# Patient Record
Sex: MALE | Race: WHITE | Employment: FULL TIME | URBAN - METROPOLITAN AREA
[De-identification: names, ages, dates, MRNs, and addresses within clinical notes are randomized per-mention and may not be internally consistent; named-entity substitution may affect disease eponyms.]

---

## 2017-11-20 ENCOUNTER — OFFICE VISIT (OUTPATIENT)
Dept: MEDICAL GROUP | Facility: PHYSICIAN GROUP | Age: 42
End: 2017-11-20
Payer: COMMERCIAL

## 2017-11-20 VITALS
RESPIRATION RATE: 14 BRPM | TEMPERATURE: 98.2 F | BODY MASS INDEX: 36.68 KG/M2 | SYSTOLIC BLOOD PRESSURE: 126 MMHG | HEIGHT: 71 IN | OXYGEN SATURATION: 96 % | DIASTOLIC BLOOD PRESSURE: 88 MMHG | WEIGHT: 262 LBS | HEART RATE: 90 BPM

## 2017-11-20 DIAGNOSIS — E66.9 OBESITY (BMI 35.0-39.9 WITHOUT COMORBIDITY): ICD-10-CM

## 2017-11-20 DIAGNOSIS — M54.50 ACUTE RIGHT-SIDED LOW BACK PAIN WITHOUT SCIATICA: ICD-10-CM

## 2017-11-20 PROCEDURE — 99214 OFFICE O/P EST MOD 30 MIN: CPT | Mod: 25 | Performed by: NURSE PRACTITIONER

## 2017-11-20 RX ORDER — KETOROLAC TROMETHAMINE 30 MG/ML
30 INJECTION, SOLUTION INTRAMUSCULAR; INTRAVENOUS ONCE
Status: DISCONTINUED | OUTPATIENT
Start: 2017-11-20 | End: 2017-11-20

## 2017-11-20 RX ORDER — CYCLOBENZAPRINE HCL 10 MG
10 TABLET ORAL 3 TIMES DAILY PRN
Qty: 30 TAB | Refills: 0 | Status: SHIPPED | OUTPATIENT
Start: 2017-11-20 | End: 2017-12-19

## 2017-11-20 RX ORDER — HYDROCODONE BITARTRATE AND ACETAMINOPHEN 5; 325 MG/1; MG/1
1-2 TABLET ORAL EVERY 4 HOURS PRN
Qty: 20 TAB | Refills: 0 | Status: SHIPPED | OUTPATIENT
Start: 2017-11-20 | End: 2017-12-19

## 2017-11-20 RX ORDER — KETOROLAC TROMETHAMINE 30 MG/ML
60 INJECTION, SOLUTION INTRAMUSCULAR; INTRAVENOUS ONCE
Status: COMPLETED | OUTPATIENT
Start: 2017-11-20 | End: 2017-11-20

## 2017-11-20 RX ADMIN — KETOROLAC TROMETHAMINE 60 MG: 30 INJECTION, SOLUTION INTRAMUSCULAR; INTRAVENOUS at 15:55

## 2017-11-20 ASSESSMENT — PATIENT HEALTH QUESTIONNAIRE - PHQ9: CLINICAL INTERPRETATION OF PHQ2 SCORE: 0

## 2017-11-20 NOTE — ASSESSMENT & PLAN NOTE
Patients bmi 36.54 today.  Has recently joined a gym.  Has a goal to lose 20 pounds in the next year.

## 2017-11-20 NOTE — PROGRESS NOTES
Chief Complaint   Patient presents with   • Low Back Pain       Subjective:   Evgeny Marcum is a 41 y.o. Male patient of Lorelei Adams here today for evaluation and management of:    Acute right-sided low back pain without sciatica  Started Saturday after sitting at bb game in chair.  Felt a tweak.  No bladder or bowel control loss.    No sciatica reported.  Unable to bear complete weight due to pain.  Right club foot.     Obesity (BMI 35.0-39.9 without comorbidity) ([facility])  Patients bmi 36.54 today.  Has recently joined a gym.  Has a goal to lose 20 pounds in the next year.      Patient had identical episode 6-8 months ago that improved with tordal, flexeril, norco prn night time only, ice    Current medicines (including changes today)  Current Outpatient Prescriptions   Medication Sig Dispense Refill   • cyclobenzaprine (FLEXERIL) 10 MG Tab Take 1 Tab by mouth 3 times a day as needed. 30 Tab 0   • hydrocodone-acetaminophen (NORCO) 5-325 MG Tab per tablet Take 1-2 Tabs by mouth every four hours as needed. 20 Tab 0   • cyclobenzaprine (FLEXERIL) 10 MG Tab Take 0.5-1 Tabs by mouth 3 times a day as needed for Mild Pain or Muscle Spasms. 30 Tab 0   • hydrocodone-acetaminophen (NORCO) 5-325 MG Tab per tablet Take 1-2 Tabs by mouth every 8 hours as needed. 20 Tab 0   • ibuprofen (MOTRIN) 800 MG Tab Take 1 Tab by mouth every 8 hours as needed for Mild Pain or Inflammation. 60 Tab 0     Current Facility-Administered Medications   Medication Dose Route Frequency Provider Last Rate Last Dose   • ketorolac (TORADOL) injection 60 mg  60 mg Intramuscular Once SARA ShirleyPTieshaRCELENA.         He  has a past medical history of Club foot. He also has no past medical history of Allergy; Asthma; Depression; Diabetes ([facility]); or Hypertension.    ROS as stated in hpi  No chest pain, no shortness of breath, no abdominal pain       Objective:     Blood pressure 126/88, pulse 90, temperature 36.8 °C (98.2 °F), resp. rate 14,  "height 1.803 m (5' 11\"), weight 118.8 kg (262 lb), SpO2 96 %. Body mass index is 36.54 kg/m².   Physical Exam:  Constitutional: Alert,patient unable to sit down, distressed  Skin: Warm, dry, good turgor,no cyanosis, no rashes in visible areas.  Eye: Equal, round and reactive, conjunctiva clear, lids normal.  Ears: No tenderness, no discharge.  External canals are without any significant edema or erythema. Gross auditory acuity is intact.  Nose: symmetrical without tenderness, no discharge.  Mouth/Throat: lips without lesion.  Oropharynx clear.  Neck: Trachea midline, no masses, no obvious thyroid enlargement.. Range of motion within normal limits.  Neuro: Cranial nerves 2-12 grossly intact.  No sensory deficit.  MSK:  Back with pain to palpation over right lower back.  No sciatica reported.  Unable to lay down to test leg raise.  Able to bend forward, but not able to twist to the right.   Respiratory: Unlabored respiratory effort, lungs clear to auscultation, no wheezes, no ronchi.  Cardiovascular: Normal S1, S2, no murmur, no edema.  Psych: Alert and oriented x3, normal affect and mood and judgement.        Assessment and Plan:   The following treatment plan was discussed    1. Acute right-sided low back pain without sciatica  This is a new problem to me. Acute. Unstable. Toradol 30 mg IM given. Flexeril 10 mg by mouth 2-3 times daily for the next 4 days. Norco 5/325 given for nighttime use only dispensed 20. Patient advised to avoid any ibuprofen or NSAIDs for the next 24 hours. Ice to the back. Gentle stretches. Referral to physical therapy for follow-up treatment. Monitor.  - REFERRAL TO PHYSICAL THERAPY Reason for Therapy: Eval/Treat/Report      Followup: Return if symptoms worsen or fail to improve.         "

## 2017-11-20 NOTE — ASSESSMENT & PLAN NOTE
Started Saturday after sitting at [employer] game in chair.  Felt a tweak.  No bladder or bowel control loss.    No sciatica reported.  Unable to bear complete weight due to pain.  Right club foot.

## 2017-12-19 ENCOUNTER — OFFICE VISIT (OUTPATIENT)
Dept: MEDICAL GROUP | Facility: PHYSICIAN GROUP | Age: 42
End: 2017-12-19
Payer: COMMERCIAL

## 2017-12-19 VITALS
DIASTOLIC BLOOD PRESSURE: 78 MMHG | OXYGEN SATURATION: 91 % | BODY MASS INDEX: 36.4 KG/M2 | RESPIRATION RATE: 16 BRPM | HEIGHT: 71 IN | WEIGHT: 260 LBS | TEMPERATURE: 98.4 F | SYSTOLIC BLOOD PRESSURE: 120 MMHG | HEART RATE: 86 BPM

## 2017-12-19 DIAGNOSIS — Q66.01 CONGENITAL TALIPES EQUINOVARUS DEFORMITY OF RIGHT FOOT: ICD-10-CM

## 2017-12-19 DIAGNOSIS — Z76.89 ENCOUNTER TO ESTABLISH CARE WITH NEW DOCTOR: ICD-10-CM

## 2017-12-19 DIAGNOSIS — Z00.00 WELLNESS EXAMINATION: ICD-10-CM

## 2017-12-19 PROBLEM — M54.50 ACUTE RIGHT-SIDED LOW BACK PAIN WITHOUT SCIATICA: Status: RESOLVED | Noted: 2017-11-20 | Resolved: 2017-12-19

## 2017-12-19 PROCEDURE — 99213 OFFICE O/P EST LOW 20 MIN: CPT | Performed by: NURSE PRACTITIONER

## 2017-12-19 NOTE — PROGRESS NOTES
Chief Complaint   Patient presents with   • Establish Care       HISTORY OF PRESENT ILLNESS: Patient is a 42 y.o. male  patient who is here to reestablish care with me in this office.  He was a patient of my previous office.  He is here today to discuss the following issues:    Encounter to establish care with new doctor  Is here to reestablish care with me.  Was previously seen by Lorelei Adams.    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  Saw a podiatrist a few years ago but did not get answers.  He would like a referral to a new podiatrist.      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     No current facility-administered medications for this visit.        Social History   Substance Use Topics   • Smoking status: Former Smoker     Packs/day: 0.50     Years: 3.00     Types: Cigarettes   • Smokeless tobacco: Never Used   • Alcohol use Yes      Comment: rarely       Family Status   Relation Status   • Mother Alive   • Father Alive   • Neg Hx      Family History   Problem Relation Age of Onset   • Cancer Neg Hx    • Diabetes Neg Hx    • Heart Attack Neg Hx        Review of Systems:   Constitutional: Negative for fever, chills, weight loss and malaise/fatigue.   HENT: Negative for ear pain, nosebleeds, congestion, sore throat and neck pain.    Eyes: Negative for blurred vision.   Respiratory: Negative for cough, sputum production, shortness of breath and wheezing.    Cardiovascular: Negative for chest pain, palpitations, orthopnea and leg swelling.   Gastrointestinal: Negative for heartburn, nausea, vomiting and abdominal pain.   Genitourinary: Negative for  "dysuria, urgency and frequency.   Musculoskeletal: Negative for myalgias, joint pain, and back pain.  Positive for right club foot with pain.  Skin: Negative for rash and itching.   Neurological: Negative for dizziness, tingling, tremors, sensory change, focal weakness and headaches.   Endo/Heme/Allergies: Does not bruise/bleed easily.   Psychiatric/Behavioral: Negative for depression, suicidal ideas and memory loss.  The patient is not nervous/anxious and does not have insomnia.    All other systems reviewed and are negative except as in HPI.    Exam:  Blood pressure 120/78, pulse 86, temperature 36.9 °C (98.4 °F), resp. rate 16, height 1.803 m (5' 11\"), weight 117.9 kg (260 lb), SpO2 91 %.  General:  Well nourished, well developed male in NAD  Head: Grossly normal.  Neck: Supple without JVD or bruit. Thyroid is not enlarged.  Pulmonary: Clear to ausculation. Normal effort. No rales, ronchi, or wheezing.  Cardiovascular: Regular rate and rhythm without murmur.   Extremities: No clubbing, cyanosis, or edema. Right foot not examined.  Skin: Intact with no obvious rashes or lesions.  Neuro: Grossly intact.  Psych: Alert and oriented x 3.  Mood and affect appropriate.    Medical decision-making and discussion: Evgeny Vu is here to reestablish care with me.  We reviewed his past medical history and discussed his current medications. Lab work was ordered, and a referral was sent to podiatry.  He will sign a records release for my previous office, he will sign up with [facility], and he will plan to follow-up here as needed.         Assessment/Plan:  1. Encounter to establish care with new doctor     2. Wellness examination  CBC WITH DIFFERENTIAL    COMP METABOLIC PANEL    LIPID PROFILE    TSH    VITAMIN D,25 HYDROXY   3. 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    Please note that this dictation was created using voice recognition " software. I have made every reasonable attempt to correct obvious errors, but I expect that there are errors of grammar and possibly content that I did not discover before finalizing the note.

## 2017-12-19 NOTE — ASSESSMENT & PLAN NOTE
He has a right club foot and has been having problems with it.  Saw a podiatrist a few years ago but did not get answers.  He would like a referral to a new podiatrist.

## 2018-08-29 ENCOUNTER — OFFICE VISIT (OUTPATIENT)
Dept: MEDICAL GROUP | Facility: PHYSICIAN GROUP | Age: 43
End: 2018-08-29
Payer: COMMERCIAL

## 2018-08-29 ENCOUNTER — HOSPITAL ENCOUNTER (OUTPATIENT)
Dept: RADIOLOGY | Facility: MEDICAL CENTER | Age: 43
End: 2018-08-29
Attending: INTERNAL MEDICINE
Payer: COMMERCIAL

## 2018-08-29 VITALS
SYSTOLIC BLOOD PRESSURE: 132 MMHG | TEMPERATURE: 100.2 F | OXYGEN SATURATION: 98 % | BODY MASS INDEX: 36.96 KG/M2 | RESPIRATION RATE: 16 BRPM | WEIGHT: 265 LBS | HEART RATE: 78 BPM | DIASTOLIC BLOOD PRESSURE: 92 MMHG

## 2018-08-29 DIAGNOSIS — L72.0 EPIDERMAL CYST OF FACE: ICD-10-CM

## 2018-08-29 DIAGNOSIS — R06.02 SHORTNESS OF BREATH: ICD-10-CM

## 2018-08-29 DIAGNOSIS — Q66.01 CONGENITAL TALIPES EQUINOVARUS DEFORMITY OF RIGHT FOOT: ICD-10-CM

## 2018-08-29 PROCEDURE — 99213 OFFICE O/P EST LOW 20 MIN: CPT | Performed by: INTERNAL MEDICINE

## 2018-08-29 PROCEDURE — 71046 X-RAY EXAM CHEST 2 VIEWS: CPT

## 2018-08-29 NOTE — ASSESSMENT & PLAN NOTE
Would like another referral to a podiatrist. Says that the referral Michael placed 12/2017 was sent to a place that doesn't accept [facility].

## 2018-08-29 NOTE — ASSESSMENT & PLAN NOTE
Was having sharp pain of his left chest with deep inhalation about 2-3 weeks ago when he was in urgent care while in [place]. The pain had started the night before left for [place]. He thought the pain was from when he was pulling on a generator but that was with his right hand. Said he was told he had a normal cardiovascular exam at the emergency room there. He declined EKG and x-rays at the ER. Had a breathing treatment there since he was told he had low oxygen. The pain recurred after he did some yard work a few days ago that resolved last night. A few nights ago he took 3 aleve at 3 am and after that was able to sleep without pain for a few more hours. Having sharp pain of left lower chest with deep inhalation. 2 nights ago he had extreme pain when he was laying flat but last night he was able to fall asleep on either side.

## 2018-08-29 NOTE — PROGRESS NOTES
"PRIMARY CARE CLINIC FOLLOW UP VISIT  Chief Complaint   Patient presents with   • Shortness of Breath   • Cyst     On Forehead    • Foot Pain     Congenital talipes equinovarus deformity     History of Present Illness     Evgeny is a pleasant 43 yo male patient of Michael's presenting with his wife for the following:     Shortness of breath  Was having sharp pain of his left chest with deep inhalation about 2-3 weeks ago when he was in urgent care while in [place]. The pain had started the night before left for [place]. He thought the pain was from when he was pulling on a generator but that was with his right hand. Said he was told he had a normal cardiovascular exam at the emergency room there. He declined EKG and x-rays at the ER. Had a breathing treatment there since he was told he had low oxygen. The pain recurred after he did some yard work a few days ago that resolved last night. A few nights ago he took 3 aleve at 3 am and after that was able to sleep without pain for a few more hours. Having sharp pain of left lower chest with deep inhalation. 2 nights ago he had extreme pain when he was laying flat but last night he was able to fall asleep on either side.     Epidermal cyst of face  His wife thinks that a cyst on the left side of his forehead is getting bigger. Denies pain but says it \"feels weird\".     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 Says that the referral Michael placed 12/2017 was sent to a place that doesn't accept [facility].       No current outpatient prescriptions on file.     No current facility-administered medications for this visit.      Past Medical History:   Diagnosis Date   • Club foot      Allergies: Pcn [penicillins]    ROS  As per HPI above. All other systems reviewed and negative.        Objective   Blood pressure 132/92, pulse 78, temperature 37.9 °C (100.2 °F), resp. rate 16, weight 120.2 kg (265 lb), SpO2 98 %. Body " mass index is 36.96 kg/m².    General: alert and oriented, pleasant, cooperative  HEENT: Normocephalic, atraumatic.  Cardiovascular: regular rate and rhythm, normal S1/S2  Pulmonary: lungs clear to auscultation bilaterally  Gastrointestinal: no tenderness to palpation. No hepatosplenomegaly. Bowel sounds normoactive  Lymphatics: no cervical or supraclavicular lymphadenopathy   Skin: cyst on left side of forehead   Psychiatric: appropriate mood and affect. Good insight and appropriate judgment     Assessment and Plan   The following treatment plan was discussed     1. Shortness of breath  Symptoms seem most consistent with costochondritis. Will evaluated with CXR but moving air well bilaterally on exam. Advised ibuprofen 400 mg bid x7 days if symptoms recur.   - DX-CHEST-2 VIEWS; Future    2. Epidermal cyst of face  - REFERRAL TO DERMATOLOGY    3. 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    Sergio Torres MD  Internal Medicine  [facility]

## 2018-08-29 NOTE — ASSESSMENT & PLAN NOTE
"His wife thinks that a cyst on the left side of his forehead is getting bigger. Denies pain but says it \"feels weird\".   "

## 2019-06-25 ENCOUNTER — OFFICE VISIT (OUTPATIENT)
Dept: MEDICAL GROUP | Facility: PHYSICIAN GROUP | Age: 44
End: 2019-06-25
Payer: COMMERCIAL

## 2019-06-25 VITALS
HEART RATE: 86 BPM | BODY MASS INDEX: 35.28 KG/M2 | OXYGEN SATURATION: 96 % | WEIGHT: 252 LBS | TEMPERATURE: 98.1 F | SYSTOLIC BLOOD PRESSURE: 152 MMHG | RESPIRATION RATE: 16 BRPM | DIASTOLIC BLOOD PRESSURE: 104 MMHG | HEIGHT: 71 IN

## 2019-06-25 DIAGNOSIS — R52 GENERALIZED BODY ACHES: ICD-10-CM

## 2019-06-25 DIAGNOSIS — R50.9 FEVER, UNSPECIFIED FEVER CAUSE: ICD-10-CM

## 2019-06-25 PROCEDURE — 99214 OFFICE O/P EST MOD 30 MIN: CPT | Performed by: NURSE PRACTITIONER

## 2019-06-25 ASSESSMENT — PATIENT HEALTH QUESTIONNAIRE - PHQ9: CLINICAL INTERPRETATION OF PHQ2 SCORE: 0

## 2019-06-25 NOTE — PROGRESS NOTES
Chief Complaint   Patient presents with   • Tingling     hands/x 3 days    • Generalized Body Aches     x 3 days        HISTORY OF PRESENT ILLNESS: Patient is a 43 y.o. male established patient who presents today to discuss the following issues:    Generalized body aches  Started 3 days ago with fever, chills, body aches, nausea, vomiting, and diarrhea.  Was camping and had a lot of bug bites.  Also recall cleaning out a shed recently and being exposed to mouse droppings.  Discussed that his symptoms were most consistent with a viral syndrome, and we will proceed with labs if his symptoms do not improve rapidly.       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     No current facility-administered medications for this visit.        Social History   Substance Use Topics   • Smoking status: Former Smoker     Packs/day: 0.50     Years: 3.00     Types: Cigarettes   • Smokeless tobacco: Never Used   • Alcohol use Yes      Comment: rarely       Family Status   Relation Status   • Mo Alive   • Fa Alive   • Neg Hx (Not Specified)     Family History   Problem Relation Age of Onset   • Cancer Neg Hx    • Diabetes Neg Hx    • Heart Attack Neg Hx        Review of Systems:   Constitutional: Positive for fever, chills, and malaise/fatigue.   HENT: Negative for ear pain, nosebleeds, congestion, sore throat and neck pain.    Eyes: Negative for blurred vision.   Respiratory: Negative for cough, sputum production, shortness of breath and wheezing.    Cardiovascular: Negative for chest pain, palpitations, orthopnea and leg swelling.   Gastrointestinal: Negative for heartburn.   "Positive for nausea, vomiting and abdominal pain.   Genitourinary: Negative for dysuria, urgency and frequency.   Musculoskeletal: Positive for myalgias, joint pain, and back pain.  Skin: Negative for rash and itching.   Neurological: Negative for dizziness, tingling, tremors, sensory change, focal weakness and headaches.   Endo/Heme/Allergies: Does not bruise/bleed easily.   Psychiatric/Behavioral: Negative for depression, suicidal ideas and memory loss.  The patient is not nervous/anxious and does not have insomnia.    All other systems reviewed and are negative except as in HPI.    Exam:  /104   Pulse 86   Temp 36.7 °C (98.1 °F)   Resp 16   Ht 1.803 m (5' 11\")   Wt 114.3 kg (252 lb)   SpO2 96%   General:  Well nourished, well developed male in NAD  Head: Grossly normal.  Neck: Supple without JVD or bruit. Thyroid is not enlarged.  Pulmonary: Clear to ausculation. Normal effort. No rales, ronchi, or wheezing.  Cardiovascular: Regular rate and rhythm without murmur.   Abdomen:  Soft, nontender, nondistended.  Extremities: No clubbing, cyanosis, or edema.  Skin: Intact with no obvious rashes or lesions.  Neuro: Grossly intact.  Psych: Alert and oriented x 3.  Mood and affect appropriate.    Medical decision-making and discussion: Evgeny Vu is here today to discuss symptoms.  He was encouraged to rest, stay hydrated, and to treat his symptoms with otc meds. We will proceed with labs if necessary, and he will follow-up here as needed.          Assessment/Plan:  1. Fever, unspecified fever cause     2. Generalized body aches         Return if symptoms worsen or fail to improve.    Please note that this dictation was created using voice recognition software. I have made every reasonable attempt to correct obvious errors, but I expect that there are errors of grammar and possibly content that I did not discover before finalizing the note.  "

## 2019-06-27 ENCOUNTER — HOSPITAL ENCOUNTER (OUTPATIENT)
Dept: LAB | Facility: MEDICAL CENTER | Age: 44
End: 2019-06-27
Attending: NURSE PRACTITIONER
Payer: COMMERCIAL

## 2019-06-27 DIAGNOSIS — R53.83 FATIGUE, UNSPECIFIED TYPE: ICD-10-CM

## 2019-06-27 DIAGNOSIS — R52 BODY ACHES: ICD-10-CM

## 2019-06-27 DIAGNOSIS — R50.9 FEVER, UNSPECIFIED FEVER CAUSE: ICD-10-CM

## 2019-06-27 LAB
ALBUMIN SERPL BCP-MCNC: 4.4 G/DL (ref 3.2–4.9)
ALBUMIN/GLOB SERPL: 1.6 G/DL
ALP SERPL-CCNC: 31 U/L (ref 30–99)
ALT SERPL-CCNC: 46 U/L (ref 2–50)
ANION GAP SERPL CALC-SCNC: 8 MMOL/L (ref 0–11.9)
AST SERPL-CCNC: 57 U/L (ref 12–45)
BASOPHILS # BLD AUTO: 0 % (ref 0–1.8)
BASOPHILS # BLD: 0 K/UL (ref 0–0.12)
BILIRUB SERPL-MCNC: 0.6 MG/DL (ref 0.1–1.5)
BUN SERPL-MCNC: 20 MG/DL (ref 8–22)
CALCIUM SERPL-MCNC: 9.1 MG/DL (ref 8.5–10.5)
CHLORIDE SERPL-SCNC: 107 MMOL/L (ref 96–112)
CO2 SERPL-SCNC: 28 MMOL/L (ref 20–33)
CREAT SERPL-MCNC: 0.76 MG/DL (ref 0.5–1.4)
CRP SERPL HS-MCNC: 0.28 MG/DL (ref 0–0.75)
EOSINOPHIL # BLD AUTO: 0.04 K/UL (ref 0–0.51)
EOSINOPHIL NFR BLD: 0.9 % (ref 0–6.9)
ERYTHROCYTE [DISTWIDTH] IN BLOOD BY AUTOMATED COUNT: 45.6 FL (ref 35.9–50)
ERYTHROCYTE [SEDIMENTATION RATE] IN BLOOD BY WESTERGREN METHOD: 6 MM/HOUR (ref 0–15)
FASTING STATUS PATIENT QL REPORTED: NORMAL
GLOBULIN SER CALC-MCNC: 2.8 G/DL (ref 1.9–3.5)
GLUCOSE SERPL-MCNC: 87 MG/DL (ref 65–99)
HCT VFR BLD AUTO: 49.2 % (ref 42–52)
HGB BLD-MCNC: 15.6 G/DL (ref 14–18)
LYMPHOCYTES # BLD AUTO: 1.22 K/UL (ref 1–4.8)
LYMPHOCYTES NFR BLD: 25.5 % (ref 22–41)
MANUAL DIFF BLD: NORMAL
MCH RBC QN AUTO: 28 PG (ref 27–33)
MCHC RBC AUTO-ENTMCNC: 31.7 G/DL (ref 33.7–35.3)
MCV RBC AUTO: 88.3 FL (ref 81.4–97.8)
MONOCYTES # BLD AUTO: 0.39 K/UL (ref 0–0.85)
MONOCYTES NFR BLD AUTO: 8.2 % (ref 0–13.4)
MORPHOLOGY BLD-IMP: NORMAL
NEUTROPHILS # BLD AUTO: 3.14 K/UL (ref 1.82–7.42)
NEUTROPHILS NFR BLD: 64.5 % (ref 44–72)
NEUTS BAND NFR BLD MANUAL: 0.9 % (ref 0–10)
NRBC # BLD AUTO: 0 K/UL
NRBC BLD-RTO: 0 /100 WBC
PLATELET # BLD AUTO: 205 K/UL (ref 164–446)
PLATELET BLD QL SMEAR: NORMAL
PMV BLD AUTO: 10.7 FL (ref 9–12.9)
POTASSIUM SERPL-SCNC: 4.2 MMOL/L (ref 3.6–5.5)
PROT SERPL-MCNC: 7.2 G/DL (ref 6–8.2)
RBC # BLD AUTO: 5.57 M/UL (ref 4.7–6.1)
RBC BLD AUTO: PRESENT
SMUDGE CELLS BLD QL SMEAR: NORMAL
SODIUM SERPL-SCNC: 143 MMOL/L (ref 135–145)
VARIANT LYMPHS BLD QL SMEAR: NORMAL
WBC # BLD AUTO: 4.8 K/UL (ref 4.8–10.8)

## 2019-06-27 PROCEDURE — 86140 C-REACTIVE PROTEIN: CPT

## 2019-06-27 PROCEDURE — 85652 RBC SED RATE AUTOMATED: CPT

## 2019-06-27 PROCEDURE — 80053 COMPREHEN METABOLIC PANEL: CPT

## 2019-06-27 PROCEDURE — 85027 COMPLETE CBC AUTOMATED: CPT

## 2019-06-27 PROCEDURE — 86618 LYME DISEASE ANTIBODY: CPT

## 2019-06-27 PROCEDURE — 85007 BL SMEAR W/DIFF WBC COUNT: CPT

## 2019-06-27 PROCEDURE — 36415 COLL VENOUS BLD VENIPUNCTURE: CPT

## 2019-06-29 LAB — B BURGDOR AB SER IA-ACNC: 0.31 LIV (ref 0–1.2)

## 2019-07-03 PROBLEM — R50.9 FEVER: Status: ACTIVE | Noted: 2019-07-03

## 2019-07-03 PROBLEM — R52 GENERALIZED BODY ACHES: Status: ACTIVE | Noted: 2019-07-03

## 2019-07-03 NOTE — ASSESSMENT & PLAN NOTE
Started 3 days ago with fever, chills, body aches, nausea, vomiting, and diarrhea.  Was camping and had a lot of bug bites.  Also recall cleaning out a shed recently and being exposed to mouse droppings.  Discussed that his symptoms were most consistent with a viral syndrome, and we will proceed with labs if his symptoms do not improve rapidly.

## 2021-01-26 ENCOUNTER — OFFICE VISIT (OUTPATIENT)
Dept: URGENT CARE | Facility: PHYSICIAN GROUP | Age: 46
End: 2021-01-26
Payer: COMMERCIAL

## 2021-01-26 VITALS
HEIGHT: 71 IN | TEMPERATURE: 96.9 F | OXYGEN SATURATION: 97 % | BODY MASS INDEX: 37.8 KG/M2 | SYSTOLIC BLOOD PRESSURE: 138 MMHG | WEIGHT: 270 LBS | HEART RATE: 99 BPM | DIASTOLIC BLOOD PRESSURE: 90 MMHG | RESPIRATION RATE: 16 BRPM

## 2021-01-26 DIAGNOSIS — M54.41 ACUTE BILATERAL LOW BACK PAIN WITH RIGHT-SIDED SCIATICA: ICD-10-CM

## 2021-01-26 PROCEDURE — 99214 OFFICE O/P EST MOD 30 MIN: CPT | Performed by: PHYSICIAN ASSISTANT

## 2021-01-26 RX ORDER — METHYLPREDNISOLONE 4 MG/1
TABLET ORAL
Qty: 1 EACH | Refills: 0 | Status: SHIPPED | OUTPATIENT
Start: 2021-01-26

## 2021-01-26 RX ORDER — CYCLOBENZAPRINE HCL 10 MG
10 TABLET ORAL 3 TIMES DAILY PRN
Qty: 30 TAB | Refills: 0 | Status: SHIPPED | OUTPATIENT
Start: 2021-01-26

## 2021-01-26 RX ORDER — KETOROLAC TROMETHAMINE 30 MG/ML
30 INJECTION, SOLUTION INTRAMUSCULAR; INTRAVENOUS ONCE
Status: DISCONTINUED | OUTPATIENT
Start: 2021-01-26 | End: 2021-01-26

## 2021-01-26 RX ORDER — KETOROLAC TROMETHAMINE 30 MG/ML
30 INJECTION, SOLUTION INTRAMUSCULAR; INTRAVENOUS ONCE
Status: COMPLETED | OUTPATIENT
Start: 2021-01-26 | End: 2021-01-26

## 2021-01-26 RX ADMIN — KETOROLAC TROMETHAMINE 30 MG: 30 INJECTION, SOLUTION INTRAMUSCULAR; INTRAVENOUS at 10:10

## 2021-01-26 ASSESSMENT — FIBROSIS 4 INDEX: FIB4 SCORE: 1.84

## 2021-01-26 ASSESSMENT — ENCOUNTER SYMPTOMS
NUMBNESS: 0
VOMITING: 0
PERIANAL NUMBNESS: 0
DIZZINESS: 0
SHORTNESS OF BREATH: 0
DIARRHEA: 0
HEADACHES: 0
FEVER: 0
PARESTHESIAS: 0
EYE DISCHARGE: 0
ABDOMINAL PAIN: 0
NAUSEA: 0
EYE REDNESS: 0
BACK PAIN: 1
TINGLING: 0
CHILLS: 0
BOWEL INCONTINENCE: 0
PARESIS: 0
LEG PAIN: 1

## 2021-01-26 ASSESSMENT — PAIN SCALES - GENERAL: PAINLEVEL: 10=SEVERE PAIN

## 2021-01-26 NOTE — PROGRESS NOTES
"Subjective:      Evgeny Marcum is a 45 y.o. male who presents with Back Pain (poped yesterday morning, hard to move, lower back pain )        Patient is accompanied by his wife.       Back Pain  This is a recurrent problem. The current episode started yesterday. The problem occurs constantly. The problem is unchanged. The pain is present in the lumbar spine. The quality of the pain is described as aching. The pain is at a severity of 9/10. The pain is severe. The symptoms are aggravated by bending, position, standing and twisting. Associated symptoms include leg pain. Pertinent negatives include no abdominal pain, bladder incontinence, bowel incontinence, chest pain, fever, headaches, numbness, paresis, paresthesias, pelvic pain, perianal numbness or tingling. He has tried NSAIDs and analgesics for the symptoms. The treatment provided mild relief.     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 He felt a \"pop\" with sudden sharp pain and intermittent radiating pain down the right leg. He's had similar low back pain in the past and states it flares about once a year. He has been taking OTC tylenol and ibuprofen without significant relief. No bowel/bladder incontinence, urinary symptoms, or distal numbness/tingling.       Review of Systems   Constitutional: Negative for chills and fever.   HENT: Negative for congestion.    Eyes: Negative for discharge and redness.   Respiratory: Negative for shortness of breath.    Cardiovascular: Negative for chest pain.   Gastrointestinal: Negative for abdominal pain, bowel incontinence, diarrhea, nausea and vomiting.   Genitourinary: Negative.  Negative for bladder incontinence and pelvic pain.   Musculoskeletal: Positive for back pain.   Skin: Negative for rash.   Neurological: Negative for dizziness, tingling, numbness, headaches and paresthesias.      Objective:     /90 " "(BP Location: Right arm, Patient Position: Standing, BP Cuff Size: Large adult)   Pulse 99   Temp 36.1 °C (96.9 °F) (Temporal)   Resp 16   Ht 1.803 m (5' 11\")   Wt 122.5 kg (270 lb)   SpO2 97%   BMI 37.66 kg/m²        Physical Exam  Vitals signs and nursing note reviewed.   Constitutional:       Appearance: Normal appearance. He is well-developed. He is not ill-appearing.   HENT:      Head: Normocephalic and atraumatic.      Right Ear: External ear normal.      Left Ear: External ear normal.   Eyes:      Conjunctiva/sclera: Conjunctivae normal.   Neck:      Musculoskeletal: Normal range of motion.   Cardiovascular:      Rate and Rhythm: Normal rate.   Pulmonary:      Effort: Pulmonary effort is normal.   Musculoskeletal:      Lumbar back: He exhibits decreased range of motion, tenderness, bony tenderness and pain.        Back:       Comments: Straight leg raise positive.    Skin:     General: Skin is warm and dry.   Neurological:      Mental Status: He is alert and oriented to person, place, and time.   Psychiatric:         Behavior: Behavior normal.          PMH:  has a past medical history of Club foot. He also has no past medical history of Allergy, Asthma, Depression, Diabetes (HCC), or Hypertension.  MEDS:   Current Outpatient Medications:   •  methylPREDNISolone (MEDROL DOSEPAK) 4 MG Tablet Therapy Pack, Take as directed, Disp: 1 Each, Rfl: 0  •  cyclobenzaprine (FLEXERIL) 10 mg Tab, Take 1 Tab by mouth 3 times a day as needed., Disp: 30 Tab, Rfl: 0    Current Facility-Administered Medications:   •  ketorolac (TORADOL) injection 30 mg, 30 mg, Intramuscular, Once, Fabiana Brar P.A.-C.  ALLERGIES:   Allergies   Allergen Reactions   • Pcn [Penicillins] Hives     SURGHX:   Past Surgical History:   Procedure Laterality Date   • CLUB FOOT RELEASE Right    • TONSILLECTOMY       SOCHX:  reports that he has quit smoking. His smoking use included cigarettes. He has a 1.50 pack-year smoking history. He has " never used smokeless tobacco. He reports current alcohol use. He reports that he does not use drugs.  FH: family history is not on file.       Assessment/Plan:        1. Acute bilateral low back pain with right-sided sciatica    - ketorolac (TORADOL) injection 30 mg   - given in urgent care with moderate relief of pain after monitoring for 15 minutes in clinic   - methylPREDNISolone (MEDROL DOSEPAK) 4 MG Tablet Therapy Pack; Take as directed  Dispense: 1 Each; Refill: 0  - cyclobenzaprine (FLEXERIL) 10 mg Tab; Take 1 Tab by mouth 3 times a day as needed.  Dispense: 30 Tab; Refill: 0   - Will cause sedation, avoid driving, operating heavy machinery, and drinking alcohol    Encouraged icing/heating 2-3 times daily followed by gentle massage and stretching. Avoid use of OTC nsaids while taking course of oral steroids due to increased risk of GI bleed. OTC tylenol as needed for pain. Monitor closely and RTC or follow up with primary care if symptoms persist/worsen. The patient demonstrated a good understanding and agreed with the treatment plan.